# Patient Record
Sex: FEMALE | Race: WHITE | NOT HISPANIC OR LATINO | URBAN - METROPOLITAN AREA
[De-identification: names, ages, dates, MRNs, and addresses within clinical notes are randomized per-mention and may not be internally consistent; named-entity substitution may affect disease eponyms.]

---
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
## 2023-05-30 ENCOUNTER — OFFICE (OUTPATIENT)
Dept: URBAN - METROPOLITAN AREA CLINIC 11 | Facility: CLINIC | Age: 18
End: 2023-05-30

## 2023-05-30 VITALS
OXYGEN SATURATION: 99 % | SYSTOLIC BLOOD PRESSURE: 111 MMHG | BODY MASS INDEX: 21.71 KG/M2 | HEART RATE: 63 BPM | DIASTOLIC BLOOD PRESSURE: 70 MMHG | HEIGHT: 61 IN | WEIGHT: 115 LBS

## 2023-05-30 DIAGNOSIS — K20.0 EOSINOPHILIC ESOPHAGITIS: ICD-10-CM

## 2023-05-30 PROCEDURE — 99213 OFFICE O/P EST LOW 20 MIN: CPT | Performed by: INTERNAL MEDICINE

## 2023-07-18 ENCOUNTER — OFFICE (OUTPATIENT)
Dept: URBAN - METROPOLITAN AREA CLINIC 11 | Facility: CLINIC | Age: 18
End: 2023-07-18

## 2023-07-18 VITALS — WEIGHT: 121 LBS | HEART RATE: 78 BPM | HEIGHT: 61 IN | OXYGEN SATURATION: 98 % | BODY MASS INDEX: 22.84 KG/M2

## 2023-07-18 DIAGNOSIS — K21.9 GASTRO-ESOPHAGEAL REFLUX DISEASE WITHOUT ESOPHAGITIS: ICD-10-CM

## 2023-07-18 DIAGNOSIS — K20.0 EOSINOPHILIC ESOPHAGITIS: ICD-10-CM

## 2023-07-18 DIAGNOSIS — R13.19 OTHER DYSPHAGIA: ICD-10-CM

## 2023-07-18 PROCEDURE — 99214 OFFICE O/P EST MOD 30 MIN: CPT | Performed by: NURSE PRACTITIONER

## 2023-07-18 RX ORDER — OMEPRAZOLE 40 MG/1
CAPSULE, DELAYED RELEASE ORAL
Qty: 60 | Refills: 5 | Status: ACTIVE

## 2023-07-18 RX ORDER — FLUTICASONE PROPIONATE 220 UG/1
AEROSOL, METERED RESPIRATORY (INHALATION)
Qty: 1 | Refills: 1 | Status: ACTIVE
Start: 2023-07-18

## 2023-07-18 NOTE — SERVICEHPINOTES
Ms. Lopez presents today for recurrent dysphagia. She noticed it reoccurring over the past three weeks. She notes that she had a severe episode of dysphagia last week while she was eating a sandwich which had to be dislodge via abdominal thrust by someone that was eating with her. She notes that she avoids food allergens consistently. She was previously treated with Pantoprazole with good response, but this medication could have lead to a feeling brain fog. She notes that she has Flovent left over which she began using again on Friday. She has had reflux intermittently over the past couple of weeks when she lays down in bed after dinner. She has not taken anything over the counter for this.

## 2023-07-18 NOTE — SERVICENOTES
We reviewed her history of EGD with recent recurrence of esophageal dysphagia and need for EGD with dilation and repeat biopsy to determine extent of eosinophils present. If in abundance we discussed potentially proceeding with Dupixent for long-term treatment. Discussed the procedure including r/b/a.

## 2023-07-19 ENCOUNTER — AMBULATORY SURGICAL CENTER (OUTPATIENT)
Dept: URBAN - METROPOLITAN AREA SURGERY 3 | Facility: SURGERY | Age: 18
End: 2023-07-19
Payer: COMMERCIAL

## 2023-07-19 ENCOUNTER — AMBULATORY SURGICAL CENTER (OUTPATIENT)
Dept: URBAN - METROPOLITAN AREA SURGERY 3 | Facility: SURGERY | Age: 18
End: 2023-07-19

## 2023-07-19 ENCOUNTER — OFFICE (OUTPATIENT)
Dept: URBAN - METROPOLITAN AREA PATHOLOGY 12 | Facility: PATHOLOGY | Age: 18
End: 2023-07-19

## 2023-07-19 VITALS
HEART RATE: 81 BPM | DIASTOLIC BLOOD PRESSURE: 56 MMHG | RESPIRATION RATE: 20 BRPM | DIASTOLIC BLOOD PRESSURE: 56 MMHG | HEART RATE: 68 BPM | RESPIRATION RATE: 18 BRPM | SYSTOLIC BLOOD PRESSURE: 105 MMHG | DIASTOLIC BLOOD PRESSURE: 52 MMHG | DIASTOLIC BLOOD PRESSURE: 61 MMHG | SYSTOLIC BLOOD PRESSURE: 112 MMHG | DIASTOLIC BLOOD PRESSURE: 52 MMHG | HEIGHT: 61 IN | TEMPERATURE: 98.3 F | HEART RATE: 68 BPM | HEART RATE: 62 BPM | OXYGEN SATURATION: 99 % | HEART RATE: 62 BPM | HEART RATE: 62 BPM | SYSTOLIC BLOOD PRESSURE: 105 MMHG | HEART RATE: 70 BPM | TEMPERATURE: 98.4 F | RESPIRATION RATE: 17 BRPM | DIASTOLIC BLOOD PRESSURE: 63 MMHG | HEART RATE: 65 BPM | RESPIRATION RATE: 17 BRPM | WEIGHT: 118 LBS | OXYGEN SATURATION: 99 % | OXYGEN SATURATION: 99 % | DIASTOLIC BLOOD PRESSURE: 66 MMHG | HEIGHT: 61 IN | RESPIRATION RATE: 20 BRPM | RESPIRATION RATE: 18 BRPM | TEMPERATURE: 98.3 F | DIASTOLIC BLOOD PRESSURE: 52 MMHG | OXYGEN SATURATION: 100 % | RESPIRATION RATE: 17 BRPM | DIASTOLIC BLOOD PRESSURE: 56 MMHG | WEIGHT: 118 LBS | HEART RATE: 81 BPM | SYSTOLIC BLOOD PRESSURE: 108 MMHG | RESPIRATION RATE: 18 BRPM | SYSTOLIC BLOOD PRESSURE: 112 MMHG | SYSTOLIC BLOOD PRESSURE: 112 MMHG | TEMPERATURE: 98.3 F | OXYGEN SATURATION: 100 % | DIASTOLIC BLOOD PRESSURE: 66 MMHG | DIASTOLIC BLOOD PRESSURE: 63 MMHG | SYSTOLIC BLOOD PRESSURE: 105 MMHG | DIASTOLIC BLOOD PRESSURE: 66 MMHG | HEART RATE: 70 BPM | SYSTOLIC BLOOD PRESSURE: 107 MMHG | HEART RATE: 65 BPM | RESPIRATION RATE: 20 BRPM | TEMPERATURE: 98.4 F | HEART RATE: 65 BPM | SYSTOLIC BLOOD PRESSURE: 108 MMHG | HEART RATE: 68 BPM | DIASTOLIC BLOOD PRESSURE: 61 MMHG | HEART RATE: 81 BPM | TEMPERATURE: 98.4 F | OXYGEN SATURATION: 100 % | HEART RATE: 70 BPM | HEIGHT: 61 IN | SYSTOLIC BLOOD PRESSURE: 107 MMHG | DIASTOLIC BLOOD PRESSURE: 63 MMHG | WEIGHT: 118 LBS | DIASTOLIC BLOOD PRESSURE: 61 MMHG | SYSTOLIC BLOOD PRESSURE: 107 MMHG | SYSTOLIC BLOOD PRESSURE: 108 MMHG

## 2023-07-19 DIAGNOSIS — K20.0 EOSINOPHILIC ESOPHAGITIS: ICD-10-CM

## 2023-07-19 DIAGNOSIS — R13.19 OTHER DYSPHAGIA: ICD-10-CM

## 2023-07-19 DIAGNOSIS — K21.00 GASTRO-ESOPHAGEAL REFLUX DISEASE WITH ESOPHAGITIS, WITHOUT B: ICD-10-CM

## 2023-07-19 DIAGNOSIS — K21.9 GASTRO-ESOPHAGEAL REFLUX DISEASE WITHOUT ESOPHAGITIS: ICD-10-CM

## 2023-07-19 PROCEDURE — 43450 DILATE ESOPHAGUS 1/MULT PASS: CPT | Mod: 51 | Performed by: INTERNAL MEDICINE

## 2023-07-19 PROCEDURE — 43239 EGD BIOPSY SINGLE/MULTIPLE: CPT | Performed by: INTERNAL MEDICINE

## 2023-07-19 PROCEDURE — 88305 TISSUE EXAM BY PATHOLOGIST: CPT | Performed by: PATHOLOGY

## 2023-09-15 ENCOUNTER — OFFICE (OUTPATIENT)
Dept: URBAN - METROPOLITAN AREA CLINIC 11 | Facility: CLINIC | Age: 18
End: 2023-09-15

## 2023-09-15 VITALS — HEIGHT: 61 IN

## 2023-09-15 DIAGNOSIS — K20.0 EOSINOPHILIC ESOPHAGITIS: ICD-10-CM

## 2023-09-15 NOTE — SERVICEHPINOTES
She presents today for telehealth for follow up after EGD. Pathology was notable for up to 29/ hpf. She notes that she has had significant improvement of dysphagia since EGD with dilation. Yesterday she did have 1 episode of dysphagia with eating cereal which she thought was related to taking Aleve and caused irritation. She denies issues with retrosternal burning, regurgitation, nausea, vomiting or abdominal pain. She continues Flovent BID since 7/18/23.

## 2023-09-15 NOTE — SERVICENOTES
We reviewed her EGD and pathology. With continued symptoms I would like to advance therapy and start her on Dupixent at this time. We discussed biologic therapy including the continued long-term use, risks, benefits and alternatives. She agrees to proceed at this time. We will plan for labs which she will let us know if there is a [facility] lab near her to get her labs. We will plan for her first injection to take place at the time of her follow up in 1 month when she has fall break.

## 2023-12-19 ENCOUNTER — OFFICE (OUTPATIENT)
Dept: URBAN - METROPOLITAN AREA CLINIC 11 | Facility: CLINIC | Age: 18
End: 2023-12-19

## 2023-12-19 VITALS
HEIGHT: 61 IN | HEART RATE: 709 BPM | WEIGHT: 115 LBS | SYSTOLIC BLOOD PRESSURE: 105 MMHG | DIASTOLIC BLOOD PRESSURE: 54 MMHG

## 2023-12-19 DIAGNOSIS — K20.0 EOSINOPHILIC ESOPHAGITIS: ICD-10-CM

## 2023-12-19 PROCEDURE — 99213 OFFICE O/P EST LOW 20 MIN: CPT | Performed by: NURSE PRACTITIONER

## 2023-12-19 RX ORDER — OMEPRAZOLE 40 MG/1
CAPSULE, DELAYED RELEASE ORAL
Qty: 60 | Refills: 5 | Status: ACTIVE

## 2023-12-19 NOTE — SERVICENOTES
We reviewed her EGD and pathology. Since her symptoms have improved with avoidance of trigger foods it is reasonable to continue the Omeprazole BID at this time and follow up when she is home for summer. If symptoms return she will call me at which time we will need to discuss Dupixent again.

## 2023-12-19 NOTE — SERVICEHPINOTES
Ms. Lopez presents today for follow up of EoE. EGD on 7/19/23 was notable for 29 eosinophils/hpf. She notes that she does not wish to do the Dupixent at this time.  She notes that when her the dysphagia was most significant she was eating eggs which she is aware that she is allergic to. Since avoidance of eggs she has had significant improvement of the dysphagia. Now she may have some reflux that leads to mild dysphagia with stress prior to exams that subsides after her stress improves. She notes that she has reflux approximately once per month. She notes that she is currently taking Omeprazole daily. She may miss a tablet approximately once per week. She denies presence o of nausea or vomiting.

## 2024-06-04 ENCOUNTER — OFFICE (OUTPATIENT)
Dept: URBAN - METROPOLITAN AREA CLINIC 11 | Facility: CLINIC | Age: 19
End: 2024-06-04

## 2024-06-04 VITALS
OXYGEN SATURATION: 99 % | HEART RATE: 82 BPM | HEIGHT: 61 IN | DIASTOLIC BLOOD PRESSURE: 78 MMHG | WEIGHT: 120 LBS | SYSTOLIC BLOOD PRESSURE: 121 MMHG

## 2024-06-04 DIAGNOSIS — K59.00 CONSTIPATION, UNSPECIFIED: ICD-10-CM

## 2024-06-04 DIAGNOSIS — K20.0 EOSINOPHILIC ESOPHAGITIS: ICD-10-CM

## 2024-06-04 PROCEDURE — 99213 OFFICE O/P EST LOW 20 MIN: CPT | Performed by: NURSE PRACTITIONER

## 2024-06-04 RX ORDER — OMEPRAZOLE 40 MG/1
CAPSULE, DELAYED RELEASE ORAL
Qty: 60 | Refills: 5 | Status: ACTIVE

## 2024-06-04 NOTE — SERVICEHPINOTES
Ms. Lopez presents today for follow up of EoE. She continues ta take Omeprazole twice per day without retrosternal burning or regurgitation. She notes that she typically does very well while she is at school without evidence of dysphagia. She notes that since she has been home and around her cat or at work she has had some issues with feeling feeling of her food getting stuck again. She saw her allergist last week who placed her back on the Flovent while she is home from school. She notes that she has had episodes of diarrhea once a month to once every other month. She notes that this typically occurs when she goes several days without a bowel movement. She notes that she will have associated cramping. She has not had particular food triggers. She notes that she typically has a bowel movement once every other day. Stools are typically formed and soft to hard. She notes that when she passes a hard stool she will have some rectal pain with a very small amount of blood on the toilet paper when she wipes.

## 2024-06-04 NOTE — SERVICENOTES
We discussed her EoE and triggers when she is home. I agree with doing the Flovent while she is home, but if symptoms return despite use of the Flovent she will call me. We discussed her bowel patter which sounds to be predominantly constipation and doing a trial of fiber with an update via the portal in about 1 month.

## 2024-12-20 ENCOUNTER — OFFICE (OUTPATIENT)
Dept: URBAN - METROPOLITAN AREA CLINIC 11 | Facility: CLINIC | Age: 19
End: 2024-12-20

## 2024-12-20 VITALS
OXYGEN SATURATION: 90 % | SYSTOLIC BLOOD PRESSURE: 115 MMHG | HEART RATE: 73 BPM | DIASTOLIC BLOOD PRESSURE: 76 MMHG | HEIGHT: 61 IN | WEIGHT: 119 LBS

## 2024-12-20 DIAGNOSIS — K20.0 EOSINOPHILIC ESOPHAGITIS: ICD-10-CM

## 2024-12-20 DIAGNOSIS — K21.9 GASTRO-ESOPHAGEAL REFLUX DISEASE WITHOUT ESOPHAGITIS: ICD-10-CM

## 2024-12-20 PROCEDURE — 99214 OFFICE O/P EST MOD 30 MIN: CPT | Performed by: NURSE PRACTITIONER

## 2024-12-20 NOTE — SERVICEHPINOTES
Ms. Lopez presents today for follow up of EoE with previous EGD in 7/2023 noting 29 eosinophils/hpf. She continues to take Omeprazole BID. She denies presence of retrosternal burning or regurgitation. She continues to swallow Flovent for several days as needed when she has issues with oral intake of aggravating foods. She notes that she has had a couple of instances of having issues with waking with a swollen lip and trouble swallowing which prompted her to see an allergist. She notes that she was taking a vitamin with celery and things that she is likely allergic to. She notes that removal of this has helped. She has had to remove fruits and vegetables due to swallowing difficulty with these foods. Since removal, she has not had trouble swallowing at all.

## 2024-12-20 NOTE — SERVICENOTES
We discussed her ongoing intermittent issues with dysphagia and intake of several allergens as well as inhalation of pollen. With ongoing issues despite avoidance of known aggravating foods, we discussed moving forward with use of Dupixent for which she agrees. Will obtain labs today and plan for new start.

## 2024-12-27 LAB
CBC, PLATELET, NO DIFFERENTIAL: HEMATOCRIT: 42.9 % (ref 34–46.6)
CBC, PLATELET, NO DIFFERENTIAL: HEMOGLOBIN: 14 G/DL (ref 11.1–15.9)
CBC, PLATELET, NO DIFFERENTIAL: MCH: 30.4 PG (ref 26.6–33)
CBC, PLATELET, NO DIFFERENTIAL: MCHC: 32.6 G/DL (ref 31.5–35.7)
CBC, PLATELET, NO DIFFERENTIAL: MCV: 93 FL (ref 79–97)
CBC, PLATELET, NO DIFFERENTIAL: PLATELETS: 279 X10E3/UL (ref 150–450)
CBC, PLATELET, NO DIFFERENTIAL: RBC: 4.6 X10E6/UL (ref 3.77–5.28)
CBC, PLATELET, NO DIFFERENTIAL: RDW: 11.6 % — LOW (ref 11.7–15.4)
CBC, PLATELET, NO DIFFERENTIAL: WBC: 5.6 X10E3/UL (ref 3.4–10.8)
COMP. METABOLIC PANEL (14): ALBUMIN: 4.6 G/DL (ref 4–5)
COMP. METABOLIC PANEL (14): ALKALINE PHOSPHATASE: 43 IU/L — LOW (ref 44–121)
COMP. METABOLIC PANEL (14): ALT (SGPT): 12 IU/L (ref 0–32)
COMP. METABOLIC PANEL (14): AST (SGOT): 20 IU/L (ref 0–40)
COMP. METABOLIC PANEL (14): BILIRUBIN, TOTAL: 0.3 MG/DL (ref 0–1.2)
COMP. METABOLIC PANEL (14): BUN/CREATININE RATIO: 16 (ref 9–23)
COMP. METABOLIC PANEL (14): BUN: 12 MG/DL (ref 6–20)
COMP. METABOLIC PANEL (14): CALCIUM: 9.5 MG/DL (ref 8.7–10.2)
COMP. METABOLIC PANEL (14): CARBON DIOXIDE, TOTAL: 23 MMOL/L (ref 20–29)
COMP. METABOLIC PANEL (14): CHLORIDE: 103 MMOL/L (ref 96–106)
COMP. METABOLIC PANEL (14): CREATININE: 0.77 MG/DL (ref 0.57–1)
COMP. METABOLIC PANEL (14): EGFR: 112 ML/MIN/1.73 (ref 59–?)
COMP. METABOLIC PANEL (14): GLOBULIN, TOTAL: 2.5 G/DL (ref 1.5–4.5)
COMP. METABOLIC PANEL (14): GLUCOSE: 98 MG/DL (ref 70–99)
COMP. METABOLIC PANEL (14): POTASSIUM: 4.4 MMOL/L (ref 3.5–5.2)
COMP. METABOLIC PANEL (14): PROTEIN, TOTAL: 7.1 G/DL (ref 6–8.5)
COMP. METABOLIC PANEL (14): SODIUM: 138 MMOL/L (ref 134–144)
HEP A AB, TOTAL: POSITIVE
HEP B SURFACE AB, QUAL: NON REACTIVE
QUANTIFERON-TB GOLD PLUS: NEGATIVE
QUANTIFERON-TB GOLD PLUS: QUANTIFERON CRITERIA: (no result)
QUANTIFERON-TB GOLD PLUS: QUANTIFERON INCUBATION: (no result)
QUANTIFERON-TB GOLD PLUS: QUANTIFERON MITOGEN VALUE: >10 IU/ML
QUANTIFERON-TB GOLD PLUS: QUANTIFERON NIL VALUE: 0 IU/ML
QUANTIFERON-TB GOLD PLUS: QUANTIFERON TB1 AG VALUE: 0 IU/ML
QUANTIFERON-TB GOLD PLUS: QUANTIFERON TB2 AG VALUE: 0.01 IU/ML

## 2025-03-14 ENCOUNTER — OFFICE (OUTPATIENT)
Dept: URBAN - METROPOLITAN AREA CLINIC 11 | Facility: CLINIC | Age: 20
End: 2025-03-14
Payer: COMMERCIAL

## 2025-03-14 VITALS
HEART RATE: 79 BPM | SYSTOLIC BLOOD PRESSURE: 114 MMHG | DIASTOLIC BLOOD PRESSURE: 71 MMHG | OXYGEN SATURATION: 95 % | HEIGHT: 61 IN | WEIGHT: 116 LBS

## 2025-03-14 DIAGNOSIS — K20.0 EOSINOPHILIC ESOPHAGITIS: ICD-10-CM

## 2025-03-14 PROCEDURE — 99213 OFFICE O/P EST LOW 20 MIN: CPT | Performed by: NURSE PRACTITIONER

## 2025-03-14 NOTE — SERVICEHPINOTES
Ms. Lopez presents today for follow up of EoE with previous EGD in 7/2023 noting 29 eosinophils/hpf. She used one injection of the Dupixent and was concerned that it caused some side effects with development of a faint feeling, significant nausea, worsening of her reflux that presented 2 days after the injection. She notes that these symptoms lasted roughly 3 days. Six days after the injection she developed hives followed by menstrual cycle like symptoms that lasted for a day. These symptoms have not reoccurred since stopping the Dupixent. She notes that she had taken the Sudafed around that time for some sinus issues and was concerned that this could potentially be a factor, but she has taken this recently without issues. She has not had many issues with her swallowing recently. She notes that eating out at a restaurant will cause some dysphagia. Since cutting vegetables and fruit completely out may be part of the issue. She notes that intake of sunflower oil seems to be an aggravator. She continues to take Omeprazole with once daily doing being effective and preventing any episodes of retrosternal burning or regurgitation. She has not had recent issues with swelling of her lips.

## 2025-03-14 NOTE — SERVICENOTES
She has done well without recent dysphagia. Since she has done well with her diet modifications, we will hold on adding to her medication regimen. Will plan for Budesonide if dysphagia returns. She will continue current PPI therapy. Discussed long-term PPI use.